# Patient Record
(demographics unavailable — no encounter records)

---

## 2024-12-09 NOTE — PHYSICAL EXAM
[Chaperone Present] : A chaperone was present in the examining room during all aspects of the physical examination [24361] : A chaperone was present during the pelvic exam. [FreeTextEntry2] : EVER [Appropriately responsive] : appropriately responsive [Alert] : alert [No Acute Distress] : no acute distress [Soft] : soft [Non-tender] : non-tender [Non-distended] : non-distended [No Lesions] : no lesions [No Mass] : no mass [Oriented x3] : oriented x3 [Examination Of The Breasts] : a normal appearance [No Masses] : no breast masses were palpable [Labia Majora] : normal [Labia Minora] : normal [Normal] : normal [Uterine Adnexae] : normal

## 2024-12-09 NOTE — DISCUSSION/SUMMARY
[FreeTextEntry1] : discussed starting baby asa and early glucose screen. Discussed prenatal care, NIPTs testing, USC, NOB appt, and measures to help w nausea: eg ting candy, vit B6. f/u w Dr Hernandes for NOB and USC. pt verbalized understanding.

## 2024-12-09 NOTE — PHYSICAL EXAM
[Chaperone Present] : A chaperone was present in the examining room during all aspects of the physical examination [37951] : A chaperone was present during the pelvic exam. [FreeTextEntry2] : EVER [Appropriately responsive] : appropriately responsive [Alert] : alert [No Acute Distress] : no acute distress [Soft] : soft [Non-tender] : non-tender [Non-distended] : non-distended [No Lesions] : no lesions [No Mass] : no mass [Oriented x3] : oriented x3 [Examination Of The Breasts] : a normal appearance [No Masses] : no breast masses were palpable [Labia Majora] : normal [Labia Minora] : normal [Normal] : normal [Uterine Adnexae] : normal

## 2024-12-09 NOTE — HISTORY OF PRESENT ILLNESS
[FreeTextEntry1] : 29 y/o  female, LMP: 10/7/24, presents as new patient for annual GYN visit and preg confirmation.  Patient denies any GYN complaints.   Menstrual Cycle: regular, monthly, mild pain and bleeding. Sexual Activity: monogamous with  Social/Mental Health: denies ETOH, tobacco or any illicit drug use.  Denies depression, anxiety, thoughts of personal harm or suicidal ideation.  ROS:  Denies fever/chills, HA, Cough/sore throat, CP, SOB, N/V, Diarrhea/Constipation, Pelvic pain, Urinary frequency/urgency/incontinence, irregular vaginal bleeding, discharge or irritation.    Medical History GYN HX: 2 : 8/15/21 male 8 lbs 7 oz cb IOL for NRFHT and gHTN?,  2022 male 8 lbs 15 oz cb  fetal hydronephrosis and GDM1. PMH: obesity PSH: denies Meds: PNV, amox for ear infection. Allergies: NKDA [PapSmeardate] : 2 years ago [Normal Amount/Duration] :  normal amount and duration [Patient refuses STI testing] : Patient refuses STI testing

## 2025-07-24 NOTE — HISTORY OF PRESENT ILLNESS
[Postpartum Follow Up] : postpartum follow up [] : delivered by vaginal delivery [Male] : Delivery History: baby boy [Wt. ___] : weighing [unfilled] [Breastfeeding] : currently nursing [Intended Contraception] : Intended Contraception: [Partner Vasectomy] : has a partner with a vasectomy [None] : no vaginal bleeding [Normal] : the vagina was normal [Doing Well] : is doing well [No Sign of Infection] : is showing no signs of infection [Excellent Pain Control] : has excellent pain control [FreeTextEntry8] : s/p  - no complaints, no pain, fever, chills, vb. Mood is good  Has some bleeding on and off still [de-identified] : s/p  - complicated by ghtn and gdma2 - stable [de-identified] : f/u annual in Dec, partner vasectomy - -recommend condoms until cleared.  pt may resume activities at 6weeks pp, pt to notify me if bleeding persists